# Patient Record
Sex: MALE | Race: WHITE | NOT HISPANIC OR LATINO | Employment: OTHER | URBAN - METROPOLITAN AREA
[De-identification: names, ages, dates, MRNs, and addresses within clinical notes are randomized per-mention and may not be internally consistent; named-entity substitution may affect disease eponyms.]

---

## 2017-02-22 ENCOUNTER — HISTORICAL (OUTPATIENT)
Dept: RADIATION THERAPY | Facility: HOSPITAL | Age: 52
End: 2017-02-22

## 2017-02-23 ENCOUNTER — OFFICE VISIT (OUTPATIENT)
Dept: OTOLARYNGOLOGY | Facility: CLINIC | Age: 52
End: 2017-02-23
Payer: OTHER GOVERNMENT

## 2017-02-23 VITALS
TEMPERATURE: 98 F | DIASTOLIC BLOOD PRESSURE: 85 MMHG | SYSTOLIC BLOOD PRESSURE: 129 MMHG | WEIGHT: 155.44 LBS | HEART RATE: 90 BPM

## 2017-02-23 DIAGNOSIS — I10 ESSENTIAL HYPERTENSION: ICD-10-CM

## 2017-02-23 DIAGNOSIS — C32.9 LARYNX CANCER: ICD-10-CM

## 2017-02-23 PROCEDURE — 31575 DIAGNOSTIC LARYNGOSCOPY: CPT | Mod: S$PBB,,, | Performed by: OTOLARYNGOLOGY

## 2017-02-23 PROCEDURE — 99203 OFFICE O/P NEW LOW 30 MIN: CPT | Mod: PBBFAC | Performed by: OTOLARYNGOLOGY

## 2017-02-23 PROCEDURE — 99999 PR PBB SHADOW E&M-NEW PATIENT-LVL III: CPT | Mod: PBBFAC,,, | Performed by: OTOLARYNGOLOGY

## 2017-02-23 PROCEDURE — 31575 DIAGNOSTIC LARYNGOSCOPY: CPT | Mod: PBBFAC | Performed by: OTOLARYNGOLOGY

## 2017-02-23 PROCEDURE — 99205 OFFICE O/P NEW HI 60 MIN: CPT | Mod: 25,S$PBB,, | Performed by: OTOLARYNGOLOGY

## 2017-02-23 RX ORDER — AMLODIPINE BESYLATE 10 MG/1
10 TABLET ORAL DAILY
COMMUNITY

## 2017-02-23 RX ORDER — CEPHALEXIN 250 MG/1
250 CAPSULE ORAL EVERY 8 HOURS
COMMUNITY
Start: 2017-02-14

## 2017-02-23 NOTE — MR AVS SNAPSHOT
[facility]  [address]  [facility] [address]  Phone: [phone]  Fax: [phone]                  Aurelio Vilchis   2017 1:00 PM   Office Visit    Description:  Male : 1965   Provider:  Micah Gonzales MD   Department:  [facility]           Reason for Visit     Consult                To Do List           Goals (5 Years of Data)     None      [facility]     [facility] On [facility] Care Line -  Assistance  Registered nurses in the [facility] Center provide clinical advisement, health education, appointment booking, and other advisory services.  Call for this free service at [phone].             Medications           Message regarding Medications     Verify the changes and/or additions to your medication regime listed below are the same as discussed with your clinician today.  If any of these changes or additions are incorrect, please notify your healthcare provider.             Verify that the below list of medications is an accurate representation of the medications you are currently taking.  If none reported, the list may be blank. If incorrect, please contact your healthcare provider. Carry this list with you in case of emergency.           Current Medications     amlodipine (NORVASC) 10 MG tablet Take 10 mg by mouth once daily.    cephALEXin (KEFLEX) 250 MG capsule Take 250 mg by mouth every 8 (eight) hours.           Clinical Reference Information           Your Vitals Were     BP Pulse Temp Weight          129/85 90 97.9 °F (36.6 °C) 70.5 kg (155 lb 6.8 oz)        Blood Pressure          Most Recent Value    BP  129/85      Allergies as of 2017     No Known Allergies      Immunizations Administered on Date of Encounter - 2017     None      [facility] Sign-Up     Activating your [facility] account is as easy as 1-2-3!     1) Visit [email or web address], select Sign Up Now, enter this activation code and your date of  birth, then select Next.  [email or web address]  Expires: 4/9/2017  1:08 PM      2) Create a username and password to use when you visit [facility] in the future and select a security question in case you lose your password and select Next.    3) Enter your e-mail address and click Sign Up!    Additional Information  If you have questions, please e-mail [email or web address] or call [phone] to talk to our [employer] staff. Remember, [facility] is NOT to be used for urgent needs. For medical emergencies, dial 911.         Language Assistance Services     ATTENTION: Language assistance services are available, free of charge. Please call [phone].      ATENCIÓN: Si habla español, tiene a fountain disposición servicios gratuitos de asistencia lingüística. Llame al [phone].     CHÚ Ý: N?u b?n nói Ti?ng Vi?t, có các d?ch v? h? tr? ngôn ng? mi?n phí dành cho b?n. G?i s? [phone].         [facility] complies with applicable Federal civil rights laws and does not discriminate on the basis of race, color, national origin, age, disability, or sex.

## 2017-02-23 NOTE — LETTER
February 23, 2017      Ju Bob MD  [address] [address]           [facility]  [address]  [facility] [address]  Phone: [phone]  Fax: [phone]          Patient: Aurelio Vilchis   MR Number: [id number]   YOB: 1965   Date of Visit: 2/23/2017       Dear Dr. Ju Bob:    Thank you for referring Aurelio Vilchis to me for evaluation. Attached you will find relevant portions of my assessment and plan of care.    If you have questions, please do not hesitate to call me. I look forward to following Aruelio Vilchis along with you.    Sincerely,    Micah Gonzales MD    Enclosure  CC:  No Recipients    If you would like to receive this communication electronically, please contact [email or web address] or [phone] to request more information on [facility] Link access.    For providers and/or their staff who would like to refer a patient to [facility], please contact us through our one-stop-shop provider referral line, [facility], at [phone].    If you feel you have received this communication in error or would no longer like to receive these types of communications, please e-mail [email or web address]

## 2017-03-08 ENCOUNTER — HISTORICAL (OUTPATIENT)
Dept: RADIATION THERAPY | Facility: HOSPITAL | Age: 52
End: 2017-03-08

## 2023-07-01 NOTE — PROGRESS NOTES
Bed: 03  Expected date:   Expected time:   Means of arrival:   Comments:  PT in room   Chief Complaint   Patient presents with    Consult     SCC       HPI   51 y.o. male presents from Dr. Ju Bob for evaluation of a T3N0M0 SCCA of the larynx.  He states that he first became hoarse several months ago.  He was subsequently seen by Dr. Bob who noted a lesion of the larynx.  He underwent DL and bx which revealed this lesion to be consistent with SCCA.  PET/CT revealed no evidence of regional or distant metastases.  He denies significant dysphagia or weight loss.  He is breathing well.  He states that he continues to carry out his normal ADL including working in his yard.  He was referred to me by Dr. Bob to discuss his surgical options.  He is scheduled to see radiation and medical oncology near his home in [place] next week.      Review of Systems   Constitutional: Negative for fatigue and unexpected weight change.   HENT: Per HPI.  Eyes: Negative for visual disturbance.   Respiratory: Negative for shortness of breath, hemoptysis   Cardiovascular: Negative for chest pain and palpitations.   Musculoskeletal: Negative for decreased ROM, back pain.   Skin: Negative for rash, sunburn, itching.   Neurological: Negative for dizziness and seizures.   Hematological: Negative for adenopathy. Does not bruise/bleed easily.   Endocrine: Negative for rapid weight loss/weight gain, heat/cold intolerance.     Past Medical History   Patient Active Problem List   Diagnosis    Essential hypertension         Past Surgical History   No past surgical history on file.      Family History   No family history on file.        Social History   .  Social History     Social History    Marital status:      Spouse name: N/A    Number of children: N/A    Years of education: N/A     Occupational History    Not on file.     Social History Main Topics    Smoking status: Not on file    Smokeless tobacco: Not on file    Alcohol use Not on file    Drug use: Not on file    Sexual activity: Not on file      Other Topics Concern    Not on file     Social History Narrative         Allergies   Review of patient's allergies indicates:  No Known Allergies        Physical Exam     Vitals:    02/23/17 1219   BP: 129/85   Pulse: 90   Temp: 97.9 °F (36.6 °C)         There is no height or weight on file to calculate BMI.      General: AOx3, NAD   Right Ear: External Auditory Canal WNL,TM w/o masses/lesions/perforations.  Middle ear without evidence of effusion.   Left Ear: External Auditory Canal WNL,TM w/o masses/lesions/perforations.  Middle ear without evidence of effusion.   Nose: No gross nasal septal deviation. Inferior Turbinates WNL bilaterally. No septal perforation. No masses/lesions.   Oral Cavity:  Oral Tongue mobile, no lesions noted. Hard Palate WNL. No buccal or FOM lesions.  Oropharynx:  No masses/lesions of the posterior pharyngeal wall. Tonsillar fossa without lesions. Soft palate without masses. Midline uvula.   Neck: No scars.  No cervical lymphadenopathy, thyromegaly or thyroid nodules.    Face: House Brackmann I bilaterally.    Flex Naso Carly Hypo Procedures #2    Procedure:  Diagnostic flexible nasopharyngoscopy, laryngoscopy and hypopharyngoscopy:    Routine preparation with local atomizer with 1% neosynephrine/pontocaine with customary flexible endoscope.    Nasopharynx:  No lesions.   Mucosa:  No lesions.   Adenoids:  Present.  Posterior Choanae:  Patent.  Eustachian Tubes:  Patent.  Posterior pharynx:  No lesions.  Larynx/hypopharynx:   Epiglottis:  No lesions, without edema.   Vocal cords:  R false cord, ventricle, AE fold with ulceronodular lesion.  Marked edema R AE fold.      Mobility:  R TVC immobile.  L TVC mobility WNL.   Hypopharynx:  No lesions.   Piriform sinus:  No pooling, no lesions.   Post Cricoid:  No erythema, no edema.    CT neck and photographs from DL reviewed    Assessment   1. Essential hypertension    2. T3N0M0 SCCA R supraglottic larynx        Plan   51 y.o. male with T3N0M0  SCCA of the R supraglottis.  This is a transglottic lesion with extension to the glottis and subglottis.  As such, he is not a candidate for partial laryngectomy.  I explained that his only reasonable surgical option is total laryngectomy.  Laryngeal conservation therapy with CRT is also a very reasonable option.      We discussed the risks, benefits, and indications to total laryngectomy with bilateral selective neck dissection of levels II-IV, including the paratracheal gutter/level VI and partial total or partial thyroidectomy. The risks were noted to include, but not be limited to, infection, bleeding, scarring, collection of blood or tissue fluid requiring drainage, weakness of the lip from retraction of the marginal mandibular nerve which may be temporary or permanent, weakness of the tongue which could be temporary or permanent and cause speech and/or swallowing difficulty, weakness of the shoulder which could be temporary or permanent, pain, chyle leakage which would require dietary modification and perhaps additional procedures, poor healing, delayed healing, pharyngocutaneous fistula requiring prolonged wound care/packing/drainage, hypothyroidism, temporary or permanent hypocalcemia, and the need for additional procedures. Without a larynx, the patient is aware that the trachea will be permanently sewn to the neck skin and that He will have to communicate with an electrolarynx, through a tracheoesophageal puncture, with esophageal speach, or by writing or gesturing. The patient will meet with speech pathology for additional counseling on life as a laryngectomee. Time was allowed for questions, and all questions were answered to the patient's and the family's apparent satisfaction.     I explained that he would spend at least one week in the hospital after surgery and would remain NPO during this time.  I also explained that we may be able to perform a primary TEP for voice restoration.  He understands that  he may require adjuvant radiation +/- chemotherapy depending on the pathologic staging.    He is to radiation and medical oncology next week.  He will decide on surgery vs CRT at that time.    He will call if he wishes to undergo surgery.  If he wishes to schedule surgery, we will have him return for pre-laryngectomy boot camp.    This was a 60 minute encounter, with 45 minutes devoted to education and a discussion of the diagnosis and management options